# Patient Record
Sex: FEMALE | Race: WHITE | NOT HISPANIC OR LATINO | Employment: UNEMPLOYED | URBAN - METROPOLITAN AREA
[De-identification: names, ages, dates, MRNs, and addresses within clinical notes are randomized per-mention and may not be internally consistent; named-entity substitution may affect disease eponyms.]

---

## 2023-07-15 ENCOUNTER — OFFICE VISIT (OUTPATIENT)
Dept: URGENT CARE | Facility: CLINIC | Age: 23
End: 2023-07-15
Payer: COMMERCIAL

## 2023-07-15 ENCOUNTER — APPOINTMENT (OUTPATIENT)
Dept: RADIOLOGY | Facility: CLINIC | Age: 23
End: 2023-07-15
Payer: COMMERCIAL

## 2023-07-15 VITALS
HEART RATE: 70 BPM | DIASTOLIC BLOOD PRESSURE: 78 MMHG | RESPIRATION RATE: 18 BRPM | OXYGEN SATURATION: 100 % | TEMPERATURE: 97.4 F | SYSTOLIC BLOOD PRESSURE: 111 MMHG

## 2023-07-15 DIAGNOSIS — S69.91XA HAND INJURY, RIGHT, INITIAL ENCOUNTER: Primary | ICD-10-CM

## 2023-07-15 DIAGNOSIS — S69.91XA HAND INJURY, RIGHT, INITIAL ENCOUNTER: ICD-10-CM

## 2023-07-15 DIAGNOSIS — S62.604A FRACTURE OF UNSPECIFIED PHALANX OF RIGHT RING FINGER, INITIAL ENCOUNTER FOR CLOSED FRACTURE: ICD-10-CM

## 2023-07-15 PROCEDURE — 73130 X-RAY EXAM OF HAND: CPT

## 2023-07-15 PROCEDURE — G0382 LEV 3 HOSP TYPE B ED VISIT: HCPCS | Performed by: PHYSICIAN ASSISTANT

## 2023-07-15 PROCEDURE — 73110 X-RAY EXAM OF WRIST: CPT

## 2023-07-15 PROCEDURE — 99283 EMERGENCY DEPT VISIT LOW MDM: CPT | Performed by: PHYSICIAN ASSISTANT

## 2023-07-15 NOTE — PATIENT INSTRUCTIONS
See Orthopedics at earliest availability for re evaluation. Wear finger splint at all times until evaluated by Orthopedics. ED if symptoms worsen. Finger Fracture   WHAT YOU NEED TO KNOW:   A finger fracture is a break in one or more of the bones in your finger. DISCHARGE INSTRUCTIONS:   Return to the emergency department if:   Your cast or splint gets wet, damaged, or comes off. Your splint or cast feels too tight. You have severe pain. Your injured finger is numb, cold, or pale. Call your doctor or hand specialist if:   Your pain or swelling gets worse, even after treatment. You have questions or concerns about your condition or care. Medicines: You may need any of the following:  NSAIDs , such as ibuprofen, help decrease swelling, pain, and fever. This medicine is available with or without a doctor's order. NSAIDs can cause stomach bleeding or kidney problems in certain people. If you take blood thinner medicine, always ask your healthcare provider if NSAIDs are safe for you. Always read the medicine label and follow directions. Acetaminophen  decreases pain and fever. It is available without a doctor's order. Ask how much to take and how often to take it. Follow directions. Read the labels of all other medicines you are using to see if they also contain acetaminophen, or ask your doctor or pharmacist. Acetaminophen can cause liver damage if not taken correctly. Prescription pain medicine  may be given. Ask your healthcare provider how to take this medicine safely. Some prescription pain medicines contain acetaminophen. Do not take other medicines that contain acetaminophen without talking to your healthcare provider. Too much acetaminophen may cause liver damage. Prescription pain medicine may cause constipation. Ask your healthcare provider how to prevent or treat constipation. Take your medicine as directed.   Contact your healthcare provider if you think your medicine is not helping or if you have side effects. Tell your provider if you are allergic to any medicine. Keep a list of the medicines, vitamins, and herbs you take. Include the amounts, and when and why you take them. Bring the list or the pill bottles to follow-up visits. Carry your medicine list with you in case of an emergency. Self-care:   Wear your splint as directed. Do not remove your splint until you follow up with your healthcare provider or hand specialist.    Apply ice  on your finger for 15 to 20 minutes every hour or as directed. Use an ice pack, or put crushed ice in a plastic bag. Cover it with a towel before you apply it to your skin. Ice helps prevent tissue damage and decreases swelling and pain. Elevate  your finger above the level of your heart as often as you can. This will help decrease swelling and pain. Prop your hand on pillows or blankets to keep it elevated comfortably. Go to physical therapy as directed. A physical therapist teaches you exercises to help improve movement and strength, and to decrease pain. Follow up with your doctor or hand specialist within 2 days:  Write down your questions so you remember to ask them during your visits. © Copyright [employer] 2022 Information is for End User's use only and may not be sold, redistributed or otherwise used for commercial purposes. The above information is an  only. It is not intended as medical advice for individual conditions or treatments. Talk to your doctor, nurse or pharmacist before following any medical regimen to see if it is safe and effective for you.

## 2023-07-15 NOTE — PROGRESS NOTES
[facility]        NAME: Erlin Sepulveda is a 26 y.o. female  : 2000    MRN: [id number]  DATE: July 15, 2023  TIME: 6:37 PM    Assessment and Plan   Hand injury, right, initial encounter [S69.91XA]  1. Hand injury, right, initial encounter  XR hand 3+ vw right    XR wrist 3+ vw right      2. Fracture of unspecified phalanx of right ring finger, initial encounter for closed fracture              Patient Instructions       Follow up with PCP in 3-5 days. Proceed to  ER if symptoms worsen. Chief Complaint     Chief Complaint   Patient presents with   • Wrist Injury     Right wrist/forearm all 5 fingers, cannot move arm, fell off a picnic table today, pain 10/10         History of Present Illness       Patient is a 22 y/o/f presenting to [facility] with right hand and wrist pain. Patient fell off of a picnic table today onto right hand. Pain to right hand, wrist and digits since that time. Patient is unable to fully move right hand/wrist.  No prior injuries. Wrist Pain   The pain is present in the right wrist and right hand. This is a new problem. The current episode started today. There has been a history of trauma. The problem occurs constantly. The problem has been unchanged. Pertinent negatives include no fever. Review of Systems   Review of Systems   Constitutional: Negative for chills and fever. HENT: Negative for ear pain and sore throat. Eyes: Negative for pain and visual disturbance. Respiratory: Negative for cough and shortness of breath. Cardiovascular: Negative for chest pain and palpitations. Gastrointestinal: Negative for abdominal pain and vomiting. Genitourinary: Negative for dysuria and hematuria. Musculoskeletal: Positive for arthralgias (Right hand and wrist pain). Negative for back pain. Skin: Negative for color change and rash. Neurological: Negative for seizures and syncope. All other systems reviewed and are negative.         Current Medications No current outpatient medications on file. Current Allergies     Allergies as of 07/15/2023 - Reviewed 07/15/2023   Allergen Reaction Noted   • Vancomycin Other (See Comments) 07/15/2023            The following portions of the patient's history were reviewed and updated as appropriate: allergies, current medications, past family history, past medical history, past social history, past surgical history and problem list.     Past Medical History:   Diagnosis Date   • Cancer ([address])        History reviewed. No pertinent surgical history. History reviewed. No pertinent family history. Medications have been verified. Objective   /78   Pulse 70   Temp (!) 97.4 °F (36.3 °C)   Resp 18   SpO2 100%   No LMP recorded. Physical Exam     Physical Exam  Constitutional:       Appearance: Normal appearance. HENT:      Head: Normocephalic and atraumatic. Nose: Nose normal.      Mouth/Throat:      Mouth: Mucous membranes are moist.   Eyes:      Extraocular Movements: Extraocular movements intact. Conjunctiva/sclera: Conjunctivae normal.      Pupils: Pupils are equal, round, and reactive to light. Cardiovascular:      Rate and Rhythm: Normal rate. Pulmonary:      Effort: Pulmonary effort is normal.   Musculoskeletal:         General: Normal range of motion. Hands:       Cervical back: Normal range of motion and neck supple. Skin:     General: Skin is warm and dry. Capillary Refill: Capillary refill takes less than 2 seconds. Neurological:      General: No focal deficit present. Mental Status: She is alert and oriented to person, place, and time.    Psychiatric:         Mood and Affect: Mood normal.         Behavior: Behavior normal.